# Patient Record
Sex: FEMALE | Race: WHITE | URBAN - METROPOLITAN AREA
[De-identification: names, ages, dates, MRNs, and addresses within clinical notes are randomized per-mention and may not be internally consistent; named-entity substitution may affect disease eponyms.]

---
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
## 2024-01-24 ENCOUNTER — NON-PROVIDER VISIT (OUTPATIENT)
Dept: URGENT CARE | Facility: CLINIC | Age: 19
End: 2024-01-24

## 2024-01-24 DIAGNOSIS — Z02.1 PRE-EMPLOYMENT DRUG SCREENING: ICD-10-CM

## 2024-01-24 LAB
AMP AMPHETAMINE: NORMAL
COC COCAINE: NORMAL
INT CON NEG: NORMAL
INT CON POS: NORMAL
MET METHAMPHETAMINES: NORMAL
OPI OPIATES: NORMAL
PCP PHENCYCLIDINE: NORMAL
POC DRUG COMMENT 753798-OCCUPATIONAL HEALTH: NEGATIVE
THC: NORMAL

## 2024-01-24 PROCEDURE — 80305 DRUG TEST PRSMV DIR OPT OBS: CPT | Performed by: NURSE PRACTITIONER

## 2024-01-24 NOTE — PROGRESS NOTES
Deborah Echavarria is a 19 y.o. female here for a non-provider visit YES    If abnormal was an in office provider notified today (if so, indicate provider)? No    Routed to PCP? No    PRE EMPLOYMENT 6 PANEL UDS

## 2024-09-13 SDOH — HEALTH STABILITY: MENTAL HEALTH
STRESS IS WHEN SOMEONE FEELS TENSE, NERVOUS, ANXIOUS, OR CAN'T SLEEP AT NIGHT BECAUSE THEIR MIND IS TROUBLED. HOW STRESSED ARE YOU?: TO SOME EXTENT

## 2024-09-13 SDOH — ECONOMIC STABILITY: HOUSING INSECURITY
IN THE LAST 12 MONTHS, WAS THERE A TIME WHEN YOU DID NOT HAVE A STEADY PLACE TO SLEEP OR SLEPT IN A SHELTER (INCLUDING NOW)?: NO

## 2024-09-13 SDOH — ECONOMIC STABILITY: TRANSPORTATION INSECURITY
IN THE PAST 12 MONTHS, HAS THE LACK OF TRANSPORTATION KEPT YOU FROM MEDICAL APPOINTMENTS OR FROM GETTING MEDICATIONS?: NO

## 2024-09-13 SDOH — HEALTH STABILITY: PHYSICAL HEALTH: ON AVERAGE, HOW MANY DAYS PER WEEK DO YOU ENGAGE IN MODERATE TO STRENUOUS EXERCISE (LIKE A BRISK WALK)?: 6 DAYS

## 2024-09-13 SDOH — ECONOMIC STABILITY: INCOME INSECURITY: IN THE LAST 12 MONTHS, WAS THERE A TIME WHEN YOU WERE NOT ABLE TO PAY THE MORTGAGE OR RENT ON TIME?: NO

## 2024-09-13 SDOH — HEALTH STABILITY: PHYSICAL HEALTH: ON AVERAGE, HOW MANY MINUTES DO YOU ENGAGE IN EXERCISE AT THIS LEVEL?: PATIENT DECLINED

## 2024-09-13 SDOH — ECONOMIC STABILITY: INCOME INSECURITY: HOW HARD IS IT FOR YOU TO PAY FOR THE VERY BASICS LIKE FOOD, HOUSING, MEDICAL CARE, AND HEATING?: SOMEWHAT HARD

## 2024-09-13 SDOH — ECONOMIC STABILITY: FOOD INSECURITY: WITHIN THE PAST 12 MONTHS, THE FOOD YOU BOUGHT JUST DIDN'T LAST AND YOU DIDN'T HAVE MONEY TO GET MORE.: NEVER TRUE

## 2024-09-13 SDOH — ECONOMIC STABILITY: TRANSPORTATION INSECURITY
IN THE PAST 12 MONTHS, HAS LACK OF TRANSPORTATION KEPT YOU FROM MEETINGS, WORK, OR FROM GETTING THINGS NEEDED FOR DAILY LIVING?: YES

## 2024-09-13 SDOH — ECONOMIC STABILITY: FOOD INSECURITY: WITHIN THE PAST 12 MONTHS, YOU WORRIED THAT YOUR FOOD WOULD RUN OUT BEFORE YOU GOT MONEY TO BUY MORE.: NEVER TRUE

## 2024-09-13 SDOH — ECONOMIC STABILITY: TRANSPORTATION INSECURITY
IN THE PAST 12 MONTHS, HAS LACK OF RELIABLE TRANSPORTATION KEPT YOU FROM MEDICAL APPOINTMENTS, MEETINGS, WORK OR FROM GETTING THINGS NEEDED FOR DAILY LIVING?: YES

## 2024-09-13 ASSESSMENT — SOCIAL DETERMINANTS OF HEALTH (SDOH)
HOW HARD IS IT FOR YOU TO PAY FOR THE VERY BASICS LIKE FOOD, HOUSING, MEDICAL CARE, AND HEATING?: SOMEWHAT HARD
HOW OFTEN DO YOU ATTEND CHURCH OR RELIGIOUS SERVICES?: NEVER
HOW OFTEN DO YOU HAVE A DRINK CONTAINING ALCOHOL: 2-4 TIMES A MONTH
HOW OFTEN DO YOU GET TOGETHER WITH FRIENDS OR RELATIVES?: MORE THAN THREE TIMES A WEEK
IN THE PAST 12 MONTHS, HAS THE ELECTRIC, GAS, OIL, OR WATER COMPANY THREATENED TO SHUT OFF SERVICE IN YOUR HOME?: NO
IN A TYPICAL WEEK, HOW MANY TIMES DO YOU TALK ON THE PHONE WITH FAMILY, FRIENDS, OR NEIGHBORS?: MORE THAN THREE TIMES A WEEK
DO YOU BELONG TO ANY CLUBS OR ORGANIZATIONS SUCH AS CHURCH GROUPS UNIONS, FRATERNAL OR ATHLETIC GROUPS, OR SCHOOL GROUPS?: NO
HOW OFTEN DO YOU ATTEND CHURCH OR RELIGIOUS SERVICES?: NEVER
DO YOU BELONG TO ANY CLUBS OR ORGANIZATIONS SUCH AS CHURCH GROUPS UNIONS, FRATERNAL OR ATHLETIC GROUPS, OR SCHOOL GROUPS?: NO
WITHIN THE PAST 12 MONTHS, YOU WORRIED THAT YOUR FOOD WOULD RUN OUT BEFORE YOU GOT THE MONEY TO BUY MORE: NEVER TRUE
ARE YOU MARRIED, WIDOWED, DIVORCED, SEPARATED, NEVER MARRIED, OR LIVING WITH A PARTNER?: NEVER MARRIED
HOW MANY DRINKS CONTAINING ALCOHOL DO YOU HAVE ON A TYPICAL DAY WHEN YOU ARE DRINKING: 1 OR 2
HOW OFTEN DO YOU HAVE SIX OR MORE DRINKS ON ONE OCCASION: LESS THAN MONTHLY
HOW OFTEN DO YOU ATTENT MEETINGS OF THE CLUB OR ORGANIZATION YOU BELONG TO?: NEVER
HOW OFTEN DO YOU ATTENT MEETINGS OF THE CLUB OR ORGANIZATION YOU BELONG TO?: NEVER
ARE YOU MARRIED, WIDOWED, DIVORCED, SEPARATED, NEVER MARRIED, OR LIVING WITH A PARTNER?: NEVER MARRIED
HOW OFTEN DO YOU GET TOGETHER WITH FRIENDS OR RELATIVES?: MORE THAN THREE TIMES A WEEK
IN A TYPICAL WEEK, HOW MANY TIMES DO YOU TALK ON THE PHONE WITH FAMILY, FRIENDS, OR NEIGHBORS?: MORE THAN THREE TIMES A WEEK

## 2024-09-13 ASSESSMENT — LIFESTYLE VARIABLES
HOW OFTEN DO YOU HAVE A DRINK CONTAINING ALCOHOL: 2-4 TIMES A MONTH
SKIP TO QUESTIONS 9-10: 0
HOW MANY STANDARD DRINKS CONTAINING ALCOHOL DO YOU HAVE ON A TYPICAL DAY: 1 OR 2
HOW OFTEN DO YOU HAVE SIX OR MORE DRINKS ON ONE OCCASION: LESS THAN MONTHLY
AUDIT-C TOTAL SCORE: 3

## 2024-09-17 ENCOUNTER — OFFICE VISIT (OUTPATIENT)
Dept: MEDICAL GROUP | Facility: PHYSICIAN GROUP | Age: 19
End: 2024-09-17
Payer: COMMERCIAL

## 2024-09-17 VITALS
SYSTOLIC BLOOD PRESSURE: 110 MMHG | DIASTOLIC BLOOD PRESSURE: 60 MMHG | HEART RATE: 88 BPM | BODY MASS INDEX: 19.24 KG/M2 | WEIGHT: 122.6 LBS | HEIGHT: 67 IN | OXYGEN SATURATION: 100 % | RESPIRATION RATE: 12 BRPM | TEMPERATURE: 98.5 F

## 2024-09-17 DIAGNOSIS — Z83.49 FAMILY HISTORY OF THYROID DISEASE IN SISTER: ICD-10-CM

## 2024-09-17 DIAGNOSIS — Z00.00 WELL ADULT EXAM: ICD-10-CM

## 2024-09-17 DIAGNOSIS — Z81.8 FAMILY HISTORY OF BIPOLAR DISORDER: ICD-10-CM

## 2024-09-17 DIAGNOSIS — F41.9 ANXIETY: ICD-10-CM

## 2024-09-17 PROCEDURE — 99203 OFFICE O/P NEW LOW 30 MIN: CPT | Performed by: NURSE PRACTITIONER

## 2024-09-17 PROCEDURE — 3074F SYST BP LT 130 MM HG: CPT | Performed by: NURSE PRACTITIONER

## 2024-09-17 PROCEDURE — 3078F DIAST BP <80 MM HG: CPT | Performed by: NURSE PRACTITIONER

## 2024-09-17 RX ORDER — ESTRADIOL 2 MG/1
TABLET ORAL
COMMUNITY
Start: 2024-08-14 | End: 2024-09-17

## 2024-09-17 RX ORDER — TRETINOIN 0.25 MG/G
CREAM TOPICAL
COMMUNITY
Start: 2024-09-14

## 2024-09-17 ASSESSMENT — ENCOUNTER SYMPTOMS
SHORTNESS OF BREATH: 0
NERVOUS/ANXIOUS: 1
EYES NEGATIVE: 1
VOMITING: 0
DIARRHEA: 0
MUSCULOSKELETAL NEGATIVE: 1
MEMORY LOSS: 0
DEPRESSION: 1
WEIGHT LOSS: 0
CHILLS: 0
CONSTIPATION: 0
FEVER: 0
PALPITATIONS: 0
DIZZINESS: 0
RESPIRATORY NEGATIVE: 1
CARDIOVASCULAR NEGATIVE: 1
ABDOMINAL PAIN: 0
INSOMNIA: 1
NAUSEA: 1
HEADACHES: 1

## 2024-09-17 ASSESSMENT — PATIENT HEALTH QUESTIONNAIRE - PHQ9
CLINICAL INTERPRETATION OF PHQ2 SCORE: 2
5. POOR APPETITE OR OVEREATING: 0 - NOT AT ALL
SUM OF ALL RESPONSES TO PHQ QUESTIONS 1-9: 8

## 2024-09-17 ASSESSMENT — LIFESTYLE VARIABLES: SUBSTANCE_ABUSE: 0

## 2024-09-18 NOTE — PROGRESS NOTES
Verbal consent was acquired by the patient to use [employer] ambient listening note generation during this visit     CC:  Chief Complaint   Patient presents with    Establish Care     Mental health concerns x last year       Deborah Echavarria 19 y.o. female  patient presenting for     History of Present Illness  The patient is a 19-year-old female who presents today to establish care and address concerns regarding her mental health, specifically anxiety and depression and family history of bipolar.    She has experienced episodes of depression since she was 12 or 13 years old. Recently, she has been feeling well for about a month, but then experiences periods of feeling unwell, describing these periods as feeling like a dream. She has started keeping a mood journal to track her feelings and often feels sad after periods of feeling good. Her most recent happy period was on the 9th, followed by a depressive episode on the 11th. She initially thought these mood swings were related to her menstrual cycle but noticed similar patterns even before starting birth control. She believes the birth control may have helped stabilize her emotions. She reports no thoughts of self-harm or harm to others. She works two jobs and recalls an incident where she sat in her car for two hours after work, contemplating driving off the freeway. She usually calls her boyfriend when she feels this way. She has never taken medication for her mental health. She occasionally experiences insomnia, depression, and anxiety, but not currently.  She reports no  self-mutilation, or cutting.      She has been experiencing more frequent headaches over the past month, which she attributes to starting her job at a . She occasionally experiences heart palpitations, but not frequently enough to cause concern.    Her periods lasted 2 to 3 weeks, so her OB/GYN put her on estrogen for a week to regulate it. She sees her OB/GYN regularly.  She is on Nexplanon.  "Her last menstrual cycle was irregular during the Nexplanon. They have just been like that since she got on it but became very irregular recently, leading to 2 to 3-week long periods. She got off of it. She was on it from 06/11/2024. She is working on that with her OB/GYN.       Review of Systems   Constitutional:  Negative for chills, fever, malaise/fatigue and weight loss.   HENT: Negative.     Eyes: Negative.    Respiratory: Negative.  Negative for shortness of breath.    Cardiovascular: Negative.  Negative for chest pain and palpitations.   Gastrointestinal:  Positive for nausea. Negative for abdominal pain, constipation, diarrhea and vomiting.   Genitourinary: Negative.    Musculoskeletal: Negative.    Skin: Negative.    Neurological:  Positive for headaches. Negative for dizziness.   Psychiatric/Behavioral:  Positive for depression. Negative for memory loss, substance abuse and suicidal ideas. The patient is nervous/anxious and has insomnia.        /60   Pulse 88   Temp 36.9 °C (98.5 °F) (Temporal)   Resp 12   Ht 1.702 m (5' 7\")   Wt 55.6 kg (122 lb 9.6 oz)   SpO2 100% , Body mass index is 19.2 kg/m².    Physical Exam  Constitutional:       Appearance: Normal appearance. She is normal weight.   HENT:      Head: Normocephalic and atraumatic.      Right Ear: Tympanic membrane, ear canal and external ear normal.      Left Ear: Tympanic membrane, ear canal and external ear normal.      Nose: Nose normal.      Mouth/Throat:      Mouth: Mucous membranes are moist.      Pharynx: Oropharynx is clear.   Eyes:      Extraocular Movements: Extraocular movements intact.      Conjunctiva/sclera: Conjunctivae normal.      Pupils: Pupils are equal, round, and reactive to light.   Cardiovascular:      Rate and Rhythm: Normal rate and regular rhythm.      Pulses: Normal pulses.      Heart sounds: Normal heart sounds.   Pulmonary:      Effort: Pulmonary effort is normal.      Breath sounds: Normal breath sounds. "   Musculoskeletal:         General: Normal range of motion.      Cervical back: Normal range of motion and neck supple. No tenderness.   Lymphadenopathy:      Cervical: No cervical adenopathy.   Skin:     General: Skin is warm and dry.      Capillary Refill: Capillary refill takes less than 2 seconds.   Neurological:      General: No focal deficit present.      Mental Status: She is alert and oriented to person, place, and time.   Psychiatric:         Mood and Affect: Mood normal.         Behavior: Behavior normal.         Thought Content: Thought content normal.         Judgment: Judgment normal.           Results      Assessment and Plan    Assessment & Plan  1. Establishment of care.  Her comprehensive metabolic panel (CMP) results were satisfactory, and her thyroid-stimulating hormone (TSH) levels were within the normal range. A  CMP, kidney function, liver function, fasting glucose, TSH, vitamin B12, and folic acid tests will be conducted. A referral to psychiatry has been arranged.    2. Anxiety.  A referral to psychiatry has been made for further evaluation and management.    3. Depression.   She has been advised to keep a mood journal to track her symptoms. A referral to psychiatry has been made for further evaluation and management.  Denies SI HI    4. Suspected Bipolar Disorder.  She reports a family history of bipolar disorder and experiences episodes of high energy followed by depressive episodes. She has been advised to keep a mood journal to track her symptoms. A referral to psychiatry has been made for further evaluation and management. Blood work including CMP, TSH, vitamin B12, and folic acid will be conducted to rule out any underlying conditions that could be contributing to her symptoms.         1. Anxiety  Chronic exacerbated condition  - Referral to Behavioral Health  - VIT B12,  FOLIC ACID    2. Family history of thyroid disease in sister  Chronic and stable condition  - TSH+FREE T4    3. Well  adult exam  - VIT B12,  FOLIC ACID  - TSH+FREE T4  - Comp Metabolic Panel; Future       Discussed with patient possible alternative diagnoses, patient is to take all medications as prescribed.     If symptoms persist FU w/PCP, if symptoms worsen go to emergency room.     If experiencing any side effects from prescribed medications reports to the office immediately or go to emergency room.    Reviewed indication, dosage, usage and potential adverse effects of prescribed medications.     Reviewed risks and benefits of treatment plan. Patient verbalizes understanding of all instruction and verbally agrees to plan.    Return for Pending labs.    This note was created using voice recognition software ([employer]). The accuracy of the dictation is limited by the abilities of the software. I have reviewed the note prior to signing, however some errors in grammar and context are still possible. If you have any questions related to this note please do not hesitate to contact our office.

## 2024-12-26 ENCOUNTER — OFFICE VISIT (OUTPATIENT)
Dept: MEDICAL GROUP | Facility: PHYSICIAN GROUP | Age: 19
End: 2024-12-26
Payer: COMMERCIAL

## 2024-12-26 VITALS
HEIGHT: 67 IN | TEMPERATURE: 98.7 F | BODY MASS INDEX: 18.52 KG/M2 | RESPIRATION RATE: 12 BRPM | HEART RATE: 78 BPM | DIASTOLIC BLOOD PRESSURE: 60 MMHG | OXYGEN SATURATION: 99 % | WEIGHT: 118 LBS | SYSTOLIC BLOOD PRESSURE: 112 MMHG

## 2024-12-26 DIAGNOSIS — R55 SYNCOPE, UNSPECIFIED SYNCOPE TYPE: ICD-10-CM

## 2024-12-26 PROCEDURE — 93000 ELECTROCARDIOGRAM COMPLETE: CPT | Performed by: NURSE PRACTITIONER

## 2024-12-26 PROCEDURE — 3078F DIAST BP <80 MM HG: CPT | Performed by: NURSE PRACTITIONER

## 2024-12-26 PROCEDURE — 99214 OFFICE O/P EST MOD 30 MIN: CPT | Performed by: NURSE PRACTITIONER

## 2024-12-26 PROCEDURE — 3074F SYST BP LT 130 MM HG: CPT | Performed by: NURSE PRACTITIONER

## 2024-12-26 RX ORDER — CLINDAMYCIN PHOSPHATE 10 UG/ML
LOTION TOPICAL
COMMUNITY
Start: 2024-12-12

## 2024-12-26 RX ORDER — OFLOXACIN 3 MG/ML
SOLUTION/ DROPS OPHTHALMIC
COMMUNITY
Start: 2024-10-09

## 2024-12-26 ASSESSMENT — ENCOUNTER SYMPTOMS
CHILLS: 0
CONSTIPATION: 0
SHORTNESS OF BREATH: 0
DIARRHEA: 0
NAUSEA: 0
NERVOUS/ANXIOUS: 0
DEPRESSION: 0
DIZZINESS: 1
HEADACHES: 0
WEIGHT LOSS: 0
LOSS OF CONSCIOUSNESS: 1
FEVER: 0
PALPITATIONS: 0
VOMITING: 1
ABDOMINAL PAIN: 0

## 2024-12-26 NOTE — PROGRESS NOTES
Verbal consent was acquired by the patient to use [employer] ambient listening note generation during this visit     CC:  Chief Complaint   Patient presents with    Faint     States fainting twice in a couple of months        Deborah Echavarria 19 y.o. female  patient presenting for     History of Present Illness  The patient is a 19-year-old female who presents today to follow up on concerns of fainting and to review her blood work.    She has experienced two episodes of syncope, the first occurring in August 2024 at a concert where she reported transient loss of vision and hearing before fainting. She attributes this episode to potential dehydration or heat exposure, even though she did not feel particularly hot and had consumed water. 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 She experienced a similar prodrome of visual disturbances, described as a fe-shaped darkening of her vision that rapidly progressed to complete visual loss, accompanied by hearing loss. Post-syncope, she experienced nausea and vomiting. She also reports frequent nausea, dizziness, and headaches. She did not have a fever or chills upon returning home. She does not report any palpitations during these episodes. After the first episode, she was able to ambulate and communicate without difficulty. However, following the second episode, she felt extremely weak and was unable to drive home. She noticed an improvement in her symptoms post-syncope, particularly the weakness in her arms. Each syncope episode lasted less than 10 seconds, with the first occurring while standing and the second while seated. She has been able to walk her dogs without any issues since these episodes. She does not report any diaphoresis, urinary or fecal incontinence during these episodes. She suspects stress as a potential trigger for these episodes, although she did not  "feel stressed during the first episode. She recently started a new job and is currently working three jobs. She maintains a regular sleep schedule, going to bed at 8:00 PM and waking up at 6:00 AM, with occasional later bedtimes.      Review of Systems   Constitutional:  Positive for malaise/fatigue. Negative for chills, fever and weight loss.   Respiratory:  Negative for shortness of breath.    Cardiovascular:  Negative for chest pain and palpitations.   Gastrointestinal:  Positive for vomiting. Negative for abdominal pain, constipation, diarrhea and nausea.   Neurological:  Positive for dizziness and loss of consciousness. Negative for headaches.   Psychiatric/Behavioral:  Negative for depression. The patient is not nervous/anxious.        /60   Pulse 78   Temp 37.1 °C (98.7 °F) (Temporal)   Resp 12   Ht 1.702 m (5' 7\")   Wt 53.5 kg (118 lb)   SpO2 99% , Body mass index is 18.48 kg/m².    Physical Exam  Constitutional:       Appearance: Normal appearance.   HENT:      Head: Normocephalic and atraumatic.   Pulmonary:      Effort: Pulmonary effort is normal.   Neurological:      Mental Status: She is alert and oriented to person, place, and time.   Psychiatric:         Mood and Affect: Mood normal.         Behavior: Behavior normal.         Thought Content: Thought content normal.         Judgment: Judgment normal.           Results  AST 13 ALT 8 alkaline phosphatase 51 sodium 139 potassium 4.0 GFR 99 glucose 98 TSH 1.69 T40.99    Assessment and Plan    Assessment & Plan  1. Syncope.  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 Symptoms included vision and hearing loss preceding the fainting, with the second episode also involving post-syncopal vomiting. No chest pain, fever, or chills were reported. Differential diagnosis includes dehydration, stress, and potential cardiac issues. An EKG will be performed to rule out any cardiac-related causes.     1. " Syncope, unspecified syncope type  Undiagnosed new condition uncertain prognosis  - EKG - Clinic Performed  EKG Interpretation-HR is 66 normal EKG, normal sinus rhythm, there are no previous tracings available for comparison  - EC-ECHOCARDIOGRAM COMPLETE W/O CONT; Future         Discussed with patient possible alternative diagnoses, patient is to take all medications as prescribed.     If symptoms persist FU w/PCP, if symptoms worsen go to emergency room.     If experiencing any side effects from prescribed medications reports to the office immediately or go to emergency room.    Reviewed indication, dosage, usage and potential adverse effects of prescribed medications.     Reviewed risks and benefits of treatment plan. Patient verbalizes understanding of all instruction and verbally agrees to plan.    Return for pending labs.    This note was created using voice recognition software ([employer]). The accuracy of the dictation is limited by the abilities of the software. I have reviewed the note prior to signing, however some errors in grammar and context are still possible. If you have any questions related to this note please do not hesitate to contact our office.